# Patient Record
Sex: FEMALE | Race: WHITE | Employment: PART TIME | URBAN - METROPOLITAN AREA
[De-identification: names, ages, dates, MRNs, and addresses within clinical notes are randomized per-mention and may not be internally consistent; named-entity substitution may affect disease eponyms.]

---

## 2017-10-27 PROBLEM — N39.0 RECURRENT UTI: Status: ACTIVE | Noted: 2017-10-27

## 2017-10-27 PROBLEM — R39.15 URGENCY OF URINATION: Status: ACTIVE | Noted: 2017-10-27

## 2018-04-05 ENCOUNTER — OFFICE VISIT (OUTPATIENT)
Dept: INFECTIOUS DISEASES | Age: 28
End: 2018-04-05
Payer: MEDICARE

## 2018-04-05 VITALS
SYSTOLIC BLOOD PRESSURE: 124 MMHG | HEART RATE: 91 BPM | OXYGEN SATURATION: 100 % | TEMPERATURE: 98.7 F | BODY MASS INDEX: 22.16 KG/M2 | WEIGHT: 129.8 LBS | HEIGHT: 64 IN | DIASTOLIC BLOOD PRESSURE: 72 MMHG | RESPIRATION RATE: 14 BRPM

## 2018-04-05 DIAGNOSIS — T37.8X5D: ICD-10-CM

## 2018-04-05 DIAGNOSIS — Z88.2 ALLERGY TO SULFA DRUGS: ICD-10-CM

## 2018-04-05 DIAGNOSIS — Z87.440 HISTORY OF RECURRENT URINARY TRACT INFECTION: Primary | ICD-10-CM

## 2018-04-05 DIAGNOSIS — Z88.1: ICD-10-CM

## 2018-04-05 PROCEDURE — G8427 DOCREV CUR MEDS BY ELIG CLIN: HCPCS | Performed by: INTERNAL MEDICINE

## 2018-04-05 PROCEDURE — 99205 OFFICE O/P NEW HI 60 MIN: CPT | Performed by: INTERNAL MEDICINE

## 2018-04-05 PROCEDURE — 1036F TOBACCO NON-USER: CPT | Performed by: INTERNAL MEDICINE

## 2018-04-05 PROCEDURE — G8420 CALC BMI NORM PARAMETERS: HCPCS | Performed by: INTERNAL MEDICINE

## 2018-04-05 RX ORDER — DEXTROAMPHETAMINE SACCHARATE, AMPHETAMINE ASPARTATE MONOHYDRATE, DEXTROAMPHETAMINE SULFATE AND AMPHETAMINE SULFATE 5; 5; 5; 5 MG/1; MG/1; MG/1; MG/1
1 CAPSULE, EXTENDED RELEASE ORAL DAILY
COMMUNITY
Start: 2018-03-20

## 2018-04-05 RX ORDER — AMOXICILLIN AND CLAVULANATE POTASSIUM 875; 125 MG/1; MG/1
1 TABLET, FILM COATED ORAL 2 TIMES DAILY
Qty: 20 TABLET | Refills: 1 | Status: SHIPPED | OUTPATIENT
Start: 2018-04-05 | End: 2018-04-15

## 2018-06-05 ENCOUNTER — TELEPHONE (OUTPATIENT)
Dept: INFECTIOUS DISEASES | Age: 28
End: 2018-06-05

## 2018-06-05 DIAGNOSIS — R30.0 DYSURIA: Primary | ICD-10-CM

## 2018-06-15 ENCOUNTER — TELEPHONE (OUTPATIENT)
Dept: INFECTIOUS DISEASES | Age: 28
End: 2018-06-15

## 2018-06-21 ENCOUNTER — OFFICE VISIT (OUTPATIENT)
Dept: INFECTIOUS DISEASES | Age: 28
End: 2018-06-21
Payer: MEDICARE

## 2018-06-21 VITALS
WEIGHT: 123 LBS | HEART RATE: 93 BPM | BODY MASS INDEX: 22.63 KG/M2 | RESPIRATION RATE: 14 BRPM | HEIGHT: 62 IN | OXYGEN SATURATION: 98 % | DIASTOLIC BLOOD PRESSURE: 78 MMHG | SYSTOLIC BLOOD PRESSURE: 106 MMHG | TEMPERATURE: 98 F

## 2018-06-21 DIAGNOSIS — N39.0 RECURRENT UTI: Primary | ICD-10-CM

## 2018-06-21 DIAGNOSIS — Z78.9 INTERMITTENT SELF-CATHETERIZATION OF BLADDER: ICD-10-CM

## 2018-06-21 PROCEDURE — G8427 DOCREV CUR MEDS BY ELIG CLIN: HCPCS | Performed by: INTERNAL MEDICINE

## 2018-06-21 PROCEDURE — 1036F TOBACCO NON-USER: CPT | Performed by: INTERNAL MEDICINE

## 2018-06-21 PROCEDURE — G8420 CALC BMI NORM PARAMETERS: HCPCS | Performed by: INTERNAL MEDICINE

## 2018-06-21 PROCEDURE — 99214 OFFICE O/P EST MOD 30 MIN: CPT | Performed by: INTERNAL MEDICINE

## 2018-06-21 NOTE — LETTER
Indicated that treatment was not improving her symptoms. Appointment was made for straight catheterization procedure to determine cause of recurrent UTI. PLAN:  · No need for straight catheterization to obtain urine culture at this point. · Monitor symptoms  · Patient to call the office if symptoms redevelop    Discussed with patient. I have personally reviewed the past medical history, past surgical history, medications, social history, and family history, and I have updated the database accordingly. Past Medical History:     Past Medical History:   Diagnosis Date    ADD (attention deficit disorder)     Caffeine use     1 pop/day    Recurrent UTI        Past Surgical  History:     Past Surgical History:   Procedure Laterality Date    TONSILLECTOMY AND ADENOIDECTOMY  2000       Medications:     Current Outpatient Prescriptions:     Polyethylene Glycol 3350 (MIRALAX PO), Take by mouth, Disp: , Rfl:     amphetamine-dextroamphetamine (ADDERALL XR) 20 MG extended release capsule, Take 1 capsule by mouth daily. , Disp: , Rfl:     Psyllium (METAMUCIL FIBER PO), Take by mouth, Disp: , Rfl:     Norgestim-Eth Estrad Triphasic (TRI-SPRINTEC PO), Take by mouth, Disp: , Rfl:     Probiotic Product (PROBIOTIC PO), Take by mouth, Disp: , Rfl:      Social History:     Social History     Social History    Marital status: Single     Spouse name: N/A    Number of children: N/A    Years of education: N/A     Occupational History    Frozen /      Social History Main Topics    Smoking status: Never Smoker    Smokeless tobacco: Never Used    Alcohol use No    Drug use: No    Sexual activity: Yes     Partners: Male     Other Topics Concern    Not on file     Social History Narrative    No narrative on file       Family History:     Family History   Problem Relation Age of Onset    Adopted: Yes        Allergies:   Bactrim [sulfamethoxazole-trimethoprim];  Metronidazole; and Nitrofurantoin

## 2018-07-03 ENCOUNTER — TELEPHONE (OUTPATIENT)
Dept: INFECTIOUS DISEASES | Age: 28
End: 2018-07-03

## 2018-07-03 NOTE — TELEPHONE ENCOUNTER
PT CAME IN TO  RECORDS. I HAD HER SIGN RELEASE FORM AND GAVE HE 4-5-2018 OFFICE NOTE. THE 6- WAS NOT COMPLETE YET. PT WOULD LIKE IT FAXED TO DR. Nikky Barboza [phone].

## 2018-07-05 NOTE — PROGRESS NOTES
[facility] - Office Progress Note  Today's Date and Time: 6/21/2018, 10:39 PM    Diagnostic Impression :     1. Recurrent UTI    2. Intermittent self-catheterization of bladder    · Allergy to sulfa and nitrofurantoin    Recommendations   · No need for straight catheterization to obtain urine culture at this point. · Monitor symptoms  · Patient to call the office if symptoms redevelop    Chief complaint/reason for consultation:     Chief Complaint   Patient presents with    Urinary Tract Infection       History of Present Illness:   Cortez Sandhu is a 32y.o.-year-old  female who was evaluated on 6/21/2018. Patient seen at the request of . INITIAL HISTORY:  Patient indicates that she suffers from recurrent UTIs. This appears to have started about 7 years ago. Since then she has had about 2 episodes a year. There have been no specific precipitating factors, although some of the UTIs correlated to sexual intercourse.     She has been previously evaluated by Dr Haylee Crockett for this problem. She had an ultrasound study that did not show any structural abnormalities or stones. She was previously advised to acidify her urine with cranberry juice and to have Cipro on hand to start when symptoms developed. The choice of Cipro appears related to the growth of Klebsiella in one urine culture a few years ago. She has also been treated with Bactrim but developed an allergy and is no longer able to employ it. More recent clean catch voided specimens have yielded growth of normal urogenital abdulkadir or no growth. The last urine sample is from 3-29-18 and has grown E coli.  It appears that an underlying anatomical cause for her recurrent problems is not present.     To exclude non bacterial infections she has been previously tested several times for Chlamydia and those test have been consistently negative.     More recently she was advised that Cipro would no longer be Surgical History:   Procedure Laterality Date    TONSILLECTOMY AND ADENOIDECTOMY  2000       Medications:     Current Outpatient Prescriptions:     Polyethylene Glycol 3350 (MIRALAX PO), Take by mouth, Disp: , Rfl:     amphetamine-dextroamphetamine (ADDERALL XR) 20 MG extended release capsule, Take 1 capsule by mouth daily. , Disp: , Rfl:     Psyllium (METAMUCIL FIBER PO), Take by mouth, Disp: , Rfl:     Norgestim-Eth Estrad Triphasic (TRI-SPRINTEC PO), Take by mouth, Disp: , Rfl:     Probiotic Product (PROBIOTIC PO), Take by mouth, Disp: , Rfl:      Social History:     Social History     Social History    Marital status: Single     Spouse name: N/A    Number of children: N/A    Years of education: N/A     Occupational History    Frozen /      Social History Main Topics    Smoking status: Never Smoker    Smokeless tobacco: Never Used    Alcohol use No    Drug use: No    Sexual activity: Yes     Partners: Male     Other Topics Concern    Not on file     Social History Narrative    No narrative on file       Family History:     Family History   Problem Relation Age of Onset    Adopted: Yes        Allergies:   Bactrim [sulfamethoxazole-trimethoprim]; Metronidazole; and Nitrofurantoin monohyd macro     Review of Systems:   Constitutional: No fevers or chills. No systemic complaints  Head: No headaches  Eyes: No double vision or blurry vision. ENT: No sore throat or runny nose. . No hearing loss, tinnitus or vertigo. Cardiovascular: No chest pain or palpitations. No shortness of breath. No MOYA  Lung: No shortness of breath or cough. No sputum production  Abdomen: No nausea, vomiting, diarrhea, or abdominal pain. .  Genitourinary: Recurrent urinary symptoms. increased urinary frequency, dysuria prior to coming in. Symptoms had subsided by time of the visit. . No hematuria. No suprapubic or CVA pain  Musculoskeletal: No muscle aches or pains.  No joint effusions, swelling or deformities  Hematologic: No bleeding or bruising. Neurologic: No headache, weakness, numbness, or tingling. Physical Examination :   /78 (Site: Left Arm, Position: Sitting, Cuff Size: Medium Adult)   Pulse 93   Temp 98 °F (36.7 °C)   Resp 14   Ht 5' 2\" (1.575 m)   Wt 123 lb (55.8 kg)   SpO2 98%   BMI 22.50 kg/m²    General Appearance: Awake, alert, and in no apparent distress  Head:  Normocephalic, no trauma  Eyes: Pupils equal, round, reactive, to light and accommodation; extraocular movements intact; sclera anicteric; conjunctivae pink. No embolic phenomena. ENT: Oropharynx clear, without erythema, exudate, or thrush. No tenderness of sinuses. Mouth/throat: mucosa pink and moist. No lesions. Dentition in good repair. Neck:Supple, without lymphadenopathy. Thyroid normal, No bruits. Pulmonary/Chest: Clear to auscultation, without wheezes, rales, or rhonchi. No dullness to percussion. Cardiovascular: Regular rate and rhythm without murmurs, rubs, or gallops. Abdomen: Soft, non tender. Bowel sounds normal. No organomegaly  All four Extremities: No cyanosis, clubbing, edema, or effusions. Neurologic: No gross sensory or motor deficits. Skin: Warm and dry with good turgor. No signs of peripheral arterial or venous insufficiency. Medical Decision Making:   I have independently reviewed/ordered the following labs:    CBC with Differential: No results found for: WBC, HGB, HCT, PLT, SEGSPCT, BANDSPCT, LYMPHOPCT, MONOPCT, EOSPCT  BMP:   No results found for: NA, K, CL, CO2, BUN, CREATININE, MG  Hepatic Function Panel: No results found for: PROT, LABALBU, BILIDIR, IBILI, BILITOT, ALKPHOS, ALT, AST  No results found for: RPR  No results found for: HIV  No results found for: BC  No results found for: MUCUS, PH, RBC, TRICHOMONAS, WBC, YEAST, TURBIDITY  No results found for: CREATININE, GLUCOSE, KETONES    Thank you for allowing us to participate in the care of this patient.  Please call with